# Patient Record
Sex: FEMALE | Race: WHITE | URBAN - METROPOLITAN AREA
[De-identification: names, ages, dates, MRNs, and addresses within clinical notes are randomized per-mention and may not be internally consistent; named-entity substitution may affect disease eponyms.]

---

## 2019-04-19 ENCOUNTER — OFFICE VISIT (OUTPATIENT)
Dept: URBAN - METROPOLITAN AREA CLINIC 81 | Facility: CLINIC | Age: 19
End: 2019-04-19
Payer: COMMERCIAL

## 2019-04-19 PROCEDURE — 92310 CONTACT LENS FITTING OU: CPT | Performed by: OPTOMETRIST

## 2019-04-19 PROCEDURE — 92014 COMPRE OPH EXAM EST PT 1/>: CPT | Performed by: OPTOMETRIST

## 2019-04-19 ASSESSMENT — VISUAL ACUITY
OS: 20/20
OD: 20/20-

## 2019-04-19 ASSESSMENT — INTRAOCULAR PRESSURE
OD: 16
OS: 16

## 2019-04-19 ASSESSMENT — KERATOMETRY
OD: 44.13
OS: 44.00

## 2019-04-19 NOTE — IMPRESSION/PLAN
Impression: Myopia, bilateral: H52.13. Plan: Glasses Rx update given. Glasses Rx older than cl's. Recommend UV protection. Potential for initial adaptation. Pt understands. Pt prefers to keep similar CL parameters as currently using, if possible.

## 2020-09-30 ENCOUNTER — OFFICE VISIT (OUTPATIENT)
Dept: URBAN - METROPOLITAN AREA CLINIC 81 | Facility: CLINIC | Age: 20
End: 2020-09-30

## 2020-09-30 DIAGNOSIS — H52.13 MYOPIA, BILATERAL: Primary | ICD-10-CM

## 2020-09-30 PROCEDURE — 92014 COMPRE OPH EXAM EST PT 1/>: CPT | Performed by: OPTOMETRIST

## 2020-09-30 PROCEDURE — 92310 CONTACT LENS FITTING OU: CPT | Performed by: OPTOMETRIST

## 2020-09-30 ASSESSMENT — INTRAOCULAR PRESSURE
OD: 17
OS: 16

## 2020-09-30 ASSESSMENT — KERATOMETRY
OD: 43.88
OS: 43.88

## 2020-09-30 ASSESSMENT — VISUAL ACUITY
OS: 20/20
OD: 20/20

## 2020-09-30 NOTE — IMPRESSION/PLAN
Impression: Myopia, bilateral: H52.13.

 - discussed ctl rx options: pt wants to keep pollard the same for ease of use (OS sl overcorrected) Plan: New spec and CTL Rx given - Discussed changes and possible adaptation period. 

RTC 1 year/PRN

## 2021-09-16 ENCOUNTER — OFFICE VISIT (OUTPATIENT)
Dept: URBAN - METROPOLITAN AREA CLINIC 81 | Facility: CLINIC | Age: 21
End: 2021-09-16

## 2021-09-16 PROCEDURE — 92012 INTRM OPH EXAM EST PATIENT: CPT | Performed by: OPTOMETRIST

## 2021-09-16 PROCEDURE — 92310 CONTACT LENS FITTING OU: CPT | Performed by: OPTOMETRIST

## 2021-09-16 ASSESSMENT — VISUAL ACUITY
OD: 20/20
OS: 20/20

## 2021-09-16 ASSESSMENT — KERATOMETRY
OD: 44.00
OS: 44.00

## 2021-09-16 ASSESSMENT — INTRAOCULAR PRESSURE
OS: 17
OD: 19

## 2023-11-14 ENCOUNTER — OFFICE VISIT (OUTPATIENT)
Dept: URBAN - METROPOLITAN AREA CLINIC 81 | Facility: CLINIC | Age: 23
End: 2023-11-14
Payer: COMMERCIAL

## 2023-11-14 DIAGNOSIS — H52.13 MYOPIA, BILATERAL: Primary | ICD-10-CM

## 2023-11-14 PROCEDURE — 92014 COMPRE OPH EXAM EST PT 1/>: CPT | Performed by: OPTOMETRIST

## 2023-11-14 PROCEDURE — 92310 CONTACT LENS FITTING OU: CPT | Performed by: OPTOMETRIST
